# Patient Record
Sex: MALE | Race: WHITE | Employment: UNEMPLOYED | URBAN - METROPOLITAN AREA
[De-identification: names, ages, dates, MRNs, and addresses within clinical notes are randomized per-mention and may not be internally consistent; named-entity substitution may affect disease eponyms.]

---

## 2020-10-19 ENCOUNTER — ANCILLARY PROCEDURE (OUTPATIENT)
Dept: GENERAL RADIOLOGY | Facility: CLINIC | Age: 20
End: 2020-10-19
Attending: PHYSICIAN ASSISTANT
Payer: COMMERCIAL

## 2020-10-19 ENCOUNTER — OFFICE VISIT (OUTPATIENT)
Dept: URGENT CARE | Facility: URGENT CARE | Age: 20
End: 2020-10-19
Payer: COMMERCIAL

## 2020-10-19 VITALS
TEMPERATURE: 97.4 F | WEIGHT: 203 LBS | HEART RATE: 66 BPM | HEIGHT: 74 IN | DIASTOLIC BLOOD PRESSURE: 78 MMHG | SYSTOLIC BLOOD PRESSURE: 120 MMHG | BODY MASS INDEX: 26.05 KG/M2 | RESPIRATION RATE: 16 BRPM | OXYGEN SATURATION: 97 %

## 2020-10-19 DIAGNOSIS — M25.532 LEFT WRIST PAIN: ICD-10-CM

## 2020-10-19 DIAGNOSIS — S62.115A NONDISPLACED FRACTURE OF TRIQUETRUM (CUNEIFORM) BONE, LEFT WRIST, INITIAL ENCOUNTER FOR CLOSED FRACTURE: Primary | ICD-10-CM

## 2020-10-19 DIAGNOSIS — H61.23 BILATERAL IMPACTED CERUMEN: ICD-10-CM

## 2020-10-19 PROCEDURE — 99204 OFFICE O/P NEW MOD 45 MIN: CPT | Performed by: PHYSICIAN ASSISTANT

## 2020-10-19 PROCEDURE — 73110 X-RAY EXAM OF WRIST: CPT | Mod: LT | Performed by: RADIOLOGY

## 2020-10-19 ASSESSMENT — ENCOUNTER SYMPTOMS
ARTHRALGIAS: 0
VOMITING: 0
MYALGIAS: 0
EYE PAIN: 0
SORE THROAT: 0
SHORTNESS OF BREATH: 0
CHILLS: 0
ENDOCRINE NEGATIVE: 1
WHEEZING: 0
DIARRHEA: 0
FATIGUE: 0
EYE REDNESS: 0
NAUSEA: 0
COUGH: 0
UNEXPECTED WEIGHT CHANGE: 0
NEUROLOGICAL NEGATIVE: 1
ALLERGIC/IMMUNOLOGIC NEGATIVE: 1
RHINORRHEA: 0
SINUS PAIN: 0
SINUS PRESSURE: 0
ABDOMINAL PAIN: 0
PALPITATIONS: 0
CONSTIPATION: 0
EYE DISCHARGE: 0
EYE ITCHING: 0
FEVER: 0

## 2020-10-19 ASSESSMENT — PAIN SCALES - GENERAL: PAINLEVEL: SEVERE PAIN (7)

## 2020-10-19 ASSESSMENT — MIFFLIN-ST. JEOR: SCORE: 1995.55

## 2020-10-19 NOTE — LETTER
[facility] URGENT CARE [place] [id number] [address]  Phone: [phone]  Fax: [phone]    October 19, 2020        Phani Nettles  [address] [place] [id number]          To whom it may concern:    RE: Phani Nettles    Patient was seen and treated today at our clinic and missed work 10/19/20 through 10/23/20    Please contact me for questions or concerns.      Sincerely,        Sneha Bui PA-C

## 2020-10-19 NOTE — PROGRESS NOTES
SUBJECTIVE:   Phani Nettles is a 20 year old male presenting with a chief complaint of   Chief Complaint   Patient presents with     Musculoskeletal Problem     1 week ago injured left wrist long boarding outside was discolored and swelling has gone down painful to picking up something.       He is a new patient of [place].    MS Injury/Pain    Onset of symptoms was 1 week(s) ago.  Location: left wrist  Context:       The injury happened while long boarding      Mechanism: fall       Patient experienced immediate pain  Course of symptoms is improving.    Severity moderate  Current and Associated symptoms: Pain  Denies  Warmth and Redness  Aggravating Factors: repetitive motion, twisting and flexion/extension  Therapies to improve symptoms include: ice and ibuprofen  This is the first time this type of problem has occurred for this patient.       Review of Systems   Constitutional: Negative for chills, fatigue, fever and unexpected weight change.   HENT: Negative for congestion, ear pain, rhinorrhea, sinus pressure, sinus pain and sore throat.    Eyes: Negative for pain, discharge, redness and itching.   Respiratory: Negative for cough, shortness of breath and wheezing.    Cardiovascular: Negative for chest pain, palpitations and leg swelling.   Gastrointestinal: Negative for abdominal pain, constipation, diarrhea, nausea and vomiting.   Endocrine: Negative.    Genitourinary: Negative.    Musculoskeletal: Negative for arthralgias and myalgias.        Left wrist pain   Skin: Negative for rash.   Allergic/Immunologic: Negative.    Neurological: Negative.        History reviewed. No pertinent past medical history.  Family History   Problem Relation Age of Onset     Lipids Mother      Hypertension Maternal Grandmother      Diabetes Maternal Grandmother      Lipids Maternal Grandmother      Lipids Maternal Grandfather      Arthritis Paternal Grandfather      Current Outpatient Medications   Medication Sig Dispense  "Refill     Acetaminophen (TYLENOL PO) Take  by mouth.       aspirin-acetaminophen-caffeine (MIGRAINE RELIEF) 250-250-65 MG per tablet Take 1 tablet by mouth every 6 hours as needed.       sumatriptan (IMITREX) 25 MG tablet Take  by mouth. Take 25mg one time. May repeat 25mg every two hours up to a maximum of 100mg in 24 hours. (Patient not taking: Reported on 10/19/2020) 8 tablet 1     Social History     Tobacco Use     Smoking status: Never Smoker     Smokeless tobacco: Never Used   Substance Use Topics     Alcohol use: No       OBJECTIVE  /78 (BP Location: Right arm, Patient Position: Sitting, Cuff Size: Adult Large)   Pulse 66   Temp 97.4  F (36.3  C)   Resp 16   Ht 1.88 m (6' 2\")   Wt 92.1 kg (203 lb)   SpO2 97%   BMI 26.06 kg/m      Physical Exam  Constitutional:       General: He is not in acute distress.     Appearance: He is well-developed.   HENT:      Head: Normocephalic and atraumatic.      Right Ear: There is impacted cerumen.      Left Ear: There is impacted cerumen.   Eyes:      Conjunctiva/sclera: Conjunctivae normal.      Pupils: Pupils are equal, round, and reactive to light.   Cardiovascular:      Rate and Rhythm: Normal rate and regular rhythm.   Pulmonary:      Effort: Pulmonary effort is normal.      Breath sounds: Normal breath sounds.   Musculoskeletal:      Comments: No obvious deformity, erythema, or ecchymosis of the left wrist. There is mild swelling and diffuse tenderness with palpation. Painful active ROM. Good capillary refill.    Skin:     General: Skin is warm and dry.      Findings: No rash.   Psychiatric:         Attention and Perception: Attention normal.         Mood and Affect: Mood normal.         Speech: Speech normal.         Behavior: Behavior normal.         Labs:  No results found for this or any previous visit (from the past 24 hour(s)).    X-Ray was done, my findings are: fracture of left cuneiform bone    ASSESSMENT:      ICD-10-CM    1. Nondisplaced " fracture of triquetrum (cuneiform) bone, left wrist, initial encounter for closed fracture  S62.115A Wrist/Arm/Hand Supplies Order for DME - ONLY FOR DME     Orthopedic & Spine  Referral   2. Left wrist pain  M25.532 XR Wrist Left G/E 3 Views   3. Bilateral impacted cerumen  H61.23         Medical Decision Making:    Differential Diagnosis:  MS Injury Pain: sprain, fracture, muscle strain and contusion    Serious Comorbid Conditions:  Adult:  None    PLAN:    MS Injury/Pain  Fitted with left wrist splint. Continue with rest, ice, elevation, and tylenol/ibuprofen as needed. Follow up with orthopedics this week.     Bilateral cerumen impaction: patient declined ear lavage today. Recommend over the counter debrox drops.     Followup:    Follow up with orthopedics this week.     There are no Patient Instructions on file for this visit.